# Patient Record
(demographics unavailable — no encounter records)

---

## 2024-10-29 NOTE — HEALTH RISK ASSESSMENT
[No] : In the past 12 months have you used drugs other than those required for medical reasons? No [No falls in past year] : Patient reported no falls in the past year [0] : 2) Feeling down, depressed, or hopeless: Not at all (0) [Former] : Former [Audit-CScore] : 0 [de-identified] : walking [de-identified] : healthy [FSC4Yxapt] : 0

## 2024-10-29 NOTE — HISTORY OF PRESENT ILLNESS
[FreeTextEntry8] : cough x several days covid dxed 10 /12/24 actually feels somewhat better today case d/w son on phone

## 2024-10-29 NOTE — HEALTH RISK ASSESSMENT
[No] : In the past 12 months have you used drugs other than those required for medical reasons? No [No falls in past year] : Patient reported no falls in the past year [0] : 2) Feeling down, depressed, or hopeless: Not at all (0) [Former] : Former [Audit-CScore] : 0 [de-identified] : walking [de-identified] : healthy [[id number]] : 0

## 2024-10-29 NOTE — HEALTH RISK ASSESSMENT
[No] : In the past 12 months have you used drugs other than those required for medical reasons? No [No falls in past year] : Patient reported no falls in the past year [0] : 2) Feeling down, depressed, or hopeless: Not at all (0) [Former] : Former [Audit-CScore] : 0 [de-identified] : walking [de-identified] : healthy [QVC1Btopf] : 0

## 2025-01-09 NOTE — PHYSICAL EXAM
[No Acute Distress] : no acute distress [Normal Oropharynx] : the oropharynx was normal [No Respiratory Distress] : no respiratory distress  [No Accessory Muscle Use] : no accessory muscle use [Regular Rhythm] : with a regular rhythm [Declined Breast Exam] : declined breast exam  [Soft] : abdomen soft [Non Tender] : non-tender [No CVA Tenderness] : no CVA  tenderness [No Joint Swelling] : no joint swelling [No Focal Deficits] : no focal deficits [Alert and Oriented x3] : oriented to person, place, and time

## 2025-01-09 NOTE — ASSESSMENT
[FreeTextEntry1] : no interval change in status taking meds as nted there is a 12 h HHA and home situation appears safe

## 2025-01-09 NOTE — HEALTH RISK ASSESSMENT
[No] : In the past 12 months have you used drugs other than those required for medical reasons? No [No falls in past year] : Patient reported no falls in the past year [0] : 2) Feeling down, depressed, or hopeless: Not at all (0) [de-identified] : No [de-identified] : No [de-identified] : Walking [de-identified] : Healthy [de-identified] : No

## 2025-01-09 NOTE — PLAN
[FreeTextEntry1] : blood sent for routine labs appropriate med renewals given received both prevnar and flu immunizations continue current management otherwise

## 2025-01-09 NOTE — HISTORY OF PRESENT ILLNESS
[FreeTextEntry1] : accompanied by HHA no current complaints awake alert verbal [de-identified] : taking meds as noted weight gain noted no current specific complaints

## 2025-01-09 NOTE — HISTORY OF PRESENT ILLNESS
[FreeTextEntry1] : accompanied by HHA no current complaints awake alert verbal [de-identified] : taking meds as noted weight gain noted no current specific complaints

## 2025-01-09 NOTE — HEALTH RISK ASSESSMENT
[No] : In the past 12 months have you used drugs other than those required for medical reasons? No [No falls in past year] : Patient reported no falls in the past year [0] : 2) Feeling down, depressed, or hopeless: Not at all (0) [de-identified] : No [de-identified] : Walking [de-identified] : No [de-identified] : Healthy [de-identified] : No

## 2025-01-09 NOTE — REVIEW OF SYSTEMS
[Fever] : no fever [Vision Problems] : no vision problems [Chest Pain] : no chest pain [Shortness Of Breath] : no shortness of breath [Headache] : no headache [Suicidal] : not suicidal

## 2025-04-03 NOTE — HISTORY OF PRESENT ILLNESS
[FreeTextEntry1] : seen and examined with HHA present [de-identified] : no current complaints taking meds as noted no interval change in status weight gain noted

## 2025-04-03 NOTE — ASSESSMENT
[FreeTextEntry1] : medically stable not interested in any surveillance monitoring home situation appears safe

## 2025-04-03 NOTE — HEALTH RISK ASSESSMENT
[Very Good] : ~his/her~ current health as very good [Good] : ~his/her~  mood as  good [1 or 2 (0 pts)] : 1 or 2 (0 points) [Never (0 pts)] : Never (0 points) [No] : In the past 12 months have you used drugs other than those required for medical reasons? No [No falls in past year] : Patient reported no falls in the past year [0] : 2) Feeling down, depressed, or hopeless: Not at all (0) [Never] : Never [0-4] : 0-4 [HIV test declined] : HIV test declined [Hepatitis C test declined] : Hepatitis C test declined [Alone] : lives alone [Smoke Detector] : smoke detector [Carbon Monoxide Detector] : carbon monoxide detector [Safety elements used in home] : safety elements used in home [Seat Belt] :  uses seat belt [Sunscreen] : uses sunscreen [Patient declined mammogram] : Patient declined mammogram [Patient declined PAP Smear] : Patient declined PAP Smear [Patient declined bone density test] : Patient declined bone density test [Patient declined colonoscopy] : Patient declined colonoscopy [de-identified] : no [de-identified] : no [de-identified] : fair [de-identified] : reg [Reports changes in hearing] : Reports no changes in hearing [Reports changes in vision] : Reports no changes in vision [Reports changes in dental health] : Reports no changes in dental health [de-identified] : walker outside, cane inside [de-identified] : yes

## 2025-04-03 NOTE — HISTORY OF PRESENT ILLNESS
[FreeTextEntry1] : seen and examined with HHA present [de-identified] : no current complaints taking meds as noted no interval change in status weight gain noted

## 2025-04-03 NOTE — HEALTH RISK ASSESSMENT
[Very Good] : ~his/her~ current health as very good [Good] : ~his/her~  mood as  good [1 or 2 (0 pts)] : 1 or 2 (0 points) [Never (0 pts)] : Never (0 points) [No] : In the past 12 months have you used drugs other than those required for medical reasons? No [No falls in past year] : Patient reported no falls in the past year [0] : 2) Feeling down, depressed, or hopeless: Not at all (0) [Never] : Never [0-4] : 0-4 [HIV test declined] : HIV test declined [Hepatitis C test declined] : Hepatitis C test declined [Alone] : lives alone [Smoke Detector] : smoke detector [Carbon Monoxide Detector] : carbon monoxide detector [Safety elements used in home] : safety elements used in home [Seat Belt] :  uses seat belt [Sunscreen] : uses sunscreen [Patient declined mammogram] : Patient declined mammogram [Patient declined PAP Smear] : Patient declined PAP Smear [Patient declined bone density test] : Patient declined bone density test [Patient declined colonoscopy] : Patient declined colonoscopy [de-identified] : no [de-identified] : no [de-identified] : fair [de-identified] : reg [Reports changes in hearing] : Reports no changes in hearing [Reports changes in vision] : Reports no changes in vision [Reports changes in dental health] : Reports no changes in dental health [de-identified] : walker outside, cane inside [de-identified] : yes

## 2025-04-03 NOTE — PHYSICAL EXAM
[No Acute Distress] : no acute distress [Normal Oropharynx] : the oropharynx was normal [No Respiratory Distress] : no respiratory distress  [No Accessory Muscle Use] : no accessory muscle use [Regular Rhythm] : with a regular rhythm [Declined Breast Exam] : declined breast exam  [Soft] : abdomen soft [Non Tender] : non-tender [Normal Supraclavicular Nodes] : no supraclavicular lymphadenopathy [No CVA Tenderness] : no CVA  tenderness [No Joint Swelling] : no joint swelling [No Focal Deficits] : no focal deficits [Alert and Oriented x3] : oriented to person, place, and time

## 2025-05-22 NOTE — PHYSICAL EXAM
[No Acute Distress] : no acute distress [Normal Oropharynx] : the oropharynx was normal [No Respiratory Distress] : no respiratory distress  [No Accessory Muscle Use] : no accessory muscle use [Regular Rhythm] : with a regular rhythm [Declined Breast Exam] : declined breast exam  [Soft] : abdomen soft [Non Tender] : non-tender [Normal Supraclavicular Nodes] : no supraclavicular lymphadenopathy [No CVA Tenderness] : no CVA  tenderness [No Joint Swelling] : no joint swelling [No Focal Deficits] : no focal deficits [Alert and Oriented x3] : oriented to person, place, and time [de-identified] : sits with good support in chair  uses walker wheelchair [de-identified] : essentially entire hx obtained from son

## 2025-05-22 NOTE — HISTORY OF PRESENT ILLNESS
[Admitted on: ___] : The patient was admitted on [unfilled] [Patient Contacted By: ____] : and contacted by [unfilled] [Discharged on ___] : discharged on [unfilled] [Post-hospitalization from ___ Hospital] : Post-hospitalization from [unfilled] Hospital [FreeTextEntry2] : [facility] admitted X 2  dx possible seizure disorder ,UTI diverticulitis hyperglycemia having been seen by neurology in hospital placed on depakote for possible seizure currently uses walker awake alert verbal son provided essentially all medical information discharged from Counts include 234 beds at the [facility] on 5/15/2024

## 2025-05-22 NOTE — HISTORY OF PRESENT ILLNESS
[Admitted on: ___] : The patient was admitted on [unfilled] [Patient Contacted By: ____] : and contacted by [unfilled] [Discharged on ___] : discharged on [unfilled] [Post-hospitalization from ___ Hospital] : Post-hospitalization from [unfilled] Hospital [FreeTextEntry2] : [facility] admitted X 2  dx possible seizure disorder ,UTI diverticulitis hyperglycemia having been seen by neurology in hospital placed on depakote for possible seizure currently uses walker awake alert verbal son provided essentially all medical information discharged from [facility] on 5/15/2024

## 2025-05-22 NOTE — HISTORY OF PRESENT ILLNESS
[FreeTextEntry1] : She was referred by her PCP Dr Pacheco for a neurological evaluation.  She is here for an initial evaluation. Her son reports she's been having short term memory loss which has been ongoing for about a year. She also tends to be repetitive that has been more prevalent now than ever before.   She is diagnosed with Hypertension, diabetes and back pain. She is currently taking aspirin, metformin and quinapril.  5/21/2025: Seizure witnessed by care giver lasting a brief minute followed by 5 minutes of disorientation, then recovery. She was taken to the hospital in [place] where her son was informed, she had UTI and hyperglycemia. She had a EEG MRI CT and was prescribed depakote 1000 mg daily in two doses. She returned home and suffered a fall, striking it her neck where she has a bruise. Taken back to the hospital she was continued on IV ciprofloxacin, after which she was brought back home she has been lethargic confused waking up during the night and sleeping during the day.  She has never slept in the daytime before.  She used to be able to walk around the house with a walker and does not do so anymore.  Her urine turned very dark purple-colored and believing this to be due to the ciprofloxacin her son discontinued the Cipro that she was being given at home.  He also has cut back on the Depakote without consultation.

## 2025-05-22 NOTE — REVIEW OF SYSTEMS
[Fever] : no fever [Vision Problems] : no vision problems [Chest Pain] : no chest pain [Shortness Of Breath] : no shortness of breath [Abdominal Pain] : no abdominal pain [Dysuria] : no dysuria [Joint Stiffness] : joint stiffness [Back Pain] : back pain [Headache] : no headache [Suicidal] : not suicidal

## 2025-05-22 NOTE — DISCUSSION/SUMMARY
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  I have requested the scans of the MRI and CT.  I have obtained the reports of these scans and the EEG.  The MRI report states no acute infarction, mild chronic microvascular ischemic change, moderate global parenchymal atrophy.  Punctate focus of low signal in the left aspect of the julio césar that may represent a chronic microhemorrhage.  This was dated 5/11/2025.  The EEG shows no generalized or focal slowing and no epileptiform discharges.  Based on her evaluation I think she had a symptomatic seizure due to combination of urinary infection and electrolyte abnormalities including hyponatremia and that she does not require the Depakote.  She has an underlying increased risk of epilepsy but if the electrolyte abnormalities and glucose abnormalities are corrected she may not develop epilepsy.  I have recommended tapering off the Depakote and continuing her current medications.  I have asked for a follow-up by telehealth next week to ensure that she has recovered.  Checking her valproic acid.

## 2025-05-22 NOTE — ASSESSMENT
[FreeTextEntry1] : discharged from [facility] having been admitted for management possible seizure,divericulitis,hyperglycemia UTI MRI in hospital showed atrophy bot no acute findings currently awake alert sits with good support in chair  still on depakote

## 2025-05-22 NOTE — ASSESSMENT
[FreeTextEntry1] : discharged from [facility] having been admitted for management possible seizure,divericulitis,hyperglycemia UTI MRI in hospital showed atrophy bot no acute findings currently awake alert sits with good support in chair  still on depakote

## 2025-05-22 NOTE — PHYSICAL EXAM
[No Acute Distress] : no acute distress [Normal Oropharynx] : the oropharynx was normal [No Respiratory Distress] : no respiratory distress  [No Accessory Muscle Use] : no accessory muscle use [Regular Rhythm] : with a regular rhythm [Declined Breast Exam] : declined breast exam  [Soft] : abdomen soft [Non Tender] : non-tender [Normal Supraclavicular Nodes] : no supraclavicular lymphadenopathy [No CVA Tenderness] : no CVA  tenderness [No Joint Swelling] : no joint swelling [No Focal Deficits] : no focal deficits [Alert and Oriented x3] : oriented to person, place, and time [de-identified] : sits with good support in chair  uses walker wheelchair [de-identified] : essentially entire hx obtained from son

## 2025-05-22 NOTE — ASSESSMENT
[FreeTextEntry1] : discharged from [facility] having been admitted for management possible seizure,divericulitis,hyperglycemia UTI MRI in hospital showed atrophy bot no acute findings currently awake alert sits with good support in chair  still on depakote

## 2025-05-22 NOTE — HISTORY OF PRESENT ILLNESS
[FreeTextEntry1] : She was referred by her PCP Dr Pacheco for a neurological evaluation.  She is here for an initial evaluation. Her son reports she's been having short term memory loss which has been ongoing for about a year. She also tends to be repetitive that has been more prevalent now than ever before.   She is diagnosed with Hypertension, diabetes and back pain. She is currently taking aspirin, metformin and quinapril.  5/21/2025: Seizure witnessed by care giver lasting a brief minute followed by 5 minutes of disorientation, then recovery. She was taken to the hospital in [place] where her son was informed, she had UTI and hyperglycemia. She had a EEG MRI CT and was prescribed depakote 1000 mg daily in two doses. She returned home and suffered a fall, striking it her neck where she has a bruise. Taken back to the hospital she was continued on IV ciprofloxacin, after which she was brought back home she has been lethargic confused waking up during the night and sleeping during the day.  She has never slept in the daytime before.  She used to be able to walk around the house with a walker and does not do so anymore.  Her urine turned very dark purple-colored and believing this to be due to the ciprofloxacin her son discontinued the Cipro that she was being given at home.  He also has cut back on the Depakote without consultation.

## 2025-05-22 NOTE — PHYSICAL EXAM
[No Acute Distress] : no acute distress [Normal Oropharynx] : the oropharynx was normal [No Respiratory Distress] : no respiratory distress  [No Accessory Muscle Use] : no accessory muscle use [Regular Rhythm] : with a regular rhythm [Declined Breast Exam] : declined breast exam  [Soft] : abdomen soft [Non Tender] : non-tender [Normal Supraclavicular Nodes] : no supraclavicular lymphadenopathy [No CVA Tenderness] : no CVA  tenderness [No Joint Swelling] : no joint swelling [No Focal Deficits] : no focal deficits [Alert and Oriented x3] : oriented to person, place, and time [de-identified] : sits with good support in chair  uses walker wheelchair [de-identified] : essentially entire hx obtained from son

## 2025-05-22 NOTE — PHYSICAL EXAM
[Person] : oriented to person [Place] : disoriented to place [Time] : disoriented to time [Short Term Intact] : short term memory impaired [Span Intact] : the attention span was decreased [Concentration Intact] : normal concentrating ability [Naming Objects] : no difficulty naming common objects [Repeating Phrases] : no difficulty repeating a phrase [Fluency] : fluency intact [Comprehension] : comprehension intact [Reading] : reading intact [Cranial Nerves Optic (II)] : visual acuity intact bilaterally,  visual fields full to confrontation, pupils equal round and reactive to light [Cranial Nerves Oculomotor (III)] : extraocular motion intact [Cranial Nerves Trigeminal (V)] : facial sensation intact symmetrically [Cranial Nerves Facial (VII)] : face symmetrical [Cranial Nerves Vestibulocochlear (VIII)] : hearing was intact bilaterally [Cranial Nerves Glossopharyngeal (IX)] : tongue and palate midline [Cranial Nerves Accessory (XI - Cranial And Spinal)] : head turning and shoulder shrug symmetric [Cranial Nerves Hypoglossal (XII)] : there was no tongue deviation with protrusion [Motor Tone] : muscle tone was normal in all four extremities [Motor Strength] : muscle strength was normal in all four extremities [Motor Strength Upper Extremities Bilaterally] : strength was normal in both upper extremities [Motor Strength Lower Extremities Bilaterally] : strength was normal in both lower extremities [Sensation Tactile Decrease] : light touch was intact [Sensation Pain / Temperature Decrease] : pain and temperature was intact [Sensation Vibration Decrease] : vibration was intact [Limited Balance] : the patient's balance was impaired [Dysdiadochokinesia Bilaterally] : not present [Coordination - Dysmetria Impaired Finger-to-Nose Bilateral] : not present [2+] : Biceps left 2+ [1+] : Patella left 1+ [0] : Ankle jerk left 0 [Plantar Reflex Right Only] : normal on the right [Plantar Reflex Left Only] : normal on the left [FreeTextEntry8] : Short steps only with assistance.  Able to turn hold cervical [Outer Ear] : the ears and nose were normal in appearance [Oropharynx] : the oropharynx was normal [Neck Appearance] : the appearance of the neck was normal [Neck Cervical Mass (___cm)] : no neck mass was observed [Jugular Venous Distention Increased] : there was no jugular-venous distention [Thyroid Diffuse Enlargement] : the thyroid was not enlarged [Thyroid Nodule] : there were no palpable thyroid nodules [Auscultation Breath Sounds / Voice Sounds] : lungs were clear to auscultation bilaterally [Heart Rate And Rhythm] : heart rate was normal and rhythm regular [Heart Sounds] : normal S1 and S2 [Heart Sounds Gallop] : no gallops [Murmurs] : no murmurs [Heart Sounds Pericardial Friction Rub] : no pericardial rub [Full Pulse] : the pedal pulses are present [Edema] : there was no peripheral edema [Bowel Sounds] : normal bowel sounds [Abdomen Soft] : soft [Abdomen Tenderness] : non-tender [] : no hepato-splenomegaly [Abdomen Mass (___ Cm)] : no abdominal mass palpated

## 2025-05-22 NOTE — HISTORY OF PRESENT ILLNESS
[Admitted on: ___] : The patient was admitted on [unfilled] [Patient Contacted By: ____] : and contacted by [unfilled] [Discharged on ___] : discharged on [unfilled] [Post-hospitalization from ___ Hospital] : Post-hospitalization from [unfilled] Hospital [FreeTextEntry2] : [facility] admitted X 2  dx possible seizure disorder ,UTI diverticulitis hyperglycemia having been seen by neurology in hospital placed on depakote for possible seizure currently uses walker awake alert verbal son provided essentially all medical information discharged from [facility] on 5/15/2024

## 2025-05-22 NOTE — PLAN
[FreeTextEntry1] : VNS involves hopefully to get more in house coverage in terms of HHA in home PT to see neurology tomorrow re any seizure meds blood sent for routine labs